# Patient Record
Sex: MALE | Race: OTHER | HISPANIC OR LATINO
[De-identification: names, ages, dates, MRNs, and addresses within clinical notes are randomized per-mention and may not be internally consistent; named-entity substitution may affect disease eponyms.]

---

## 2022-01-19 PROBLEM — Z00.00 ENCOUNTER FOR PREVENTIVE HEALTH EXAMINATION: Status: ACTIVE | Noted: 2022-01-19

## 2022-01-20 ENCOUNTER — APPOINTMENT (OUTPATIENT)
Dept: OTOLARYNGOLOGY | Facility: CLINIC | Age: 52
End: 2022-01-20
Payer: COMMERCIAL

## 2022-01-20 PROCEDURE — 31231 NASAL ENDOSCOPY DX: CPT

## 2022-01-20 PROCEDURE — 99204 OFFICE O/P NEW MOD 45 MIN: CPT | Mod: 25

## 2022-01-20 NOTE — DATA REVIEWED
[de-identified] : CT Sinus 9/23/21:\par INDINGS:\par \par The frontal sinuses are well-developed . There is mucosal thickening along the inferior margins of the frontal sinuses, right greater than left.\par \par There is moderate mucosal thickening throughout the ethmoid air cells which results in opacification multiple ethmoid air cells, left greater than right.\par \par The frontoethmoidal recesses are obstructed by mucosal thickening.\par \par The sphenoid sinuses are well developed . There is mucosal thickening along the anterior walls of the sphenoid sinuses. The sphenoethmoidal recess is obstructed and the left sphenoethmoidal recess is narrowed by mucosal thickening. The sphenoid sinus septum an accessory septation insert upon the osseous covering of the left internal carotid artery.\par \par The maxillary sinuses are well developed . There is moderate mucosal thickening along the periphery the maxillary sinuses, left greater than right. There is suggestion of layering fluid in the left maxillary sinus.. The ostiomeatal complexes are obstructed by mucosal thickening.\par \par The nasal septum is deviated towards the left with an associated septal spur which contacts the left inferior turbinate. The cartilaginous segment of the nasal septum contacts the left nasal ala with narrowing of the vestibule. There is contact between the mucosal surface of the nasal septum and bilateral middle and superior turbinates . There is hypertrophy and polypoid changes of the mucosa of the nasal turbinates and nasal septum.\par \par The included brain parenchyma and orbits are normal.\par \par The sinocranial and sinoorbital junctions are intact.\par \par There is no mastoid effusion.\par \par The nasopharynx is unremarkable.\par \par The temporomandibular joints are notable for cortical erosions in the mandibular condyles..\par \par IMPRESSION:\par \par Pansinus inflammatory changes. There is suggestion of layering fluid in the left maxillary sinus which suggest superimposed acute sinusitis.\par \par There is hypertrophy of the nasal turbinates including polypoid changes of the mucosa, anatomic variants and nasal septal deviation which result in obstruction of air passages and drainage pathways.\par \par There is evidence of nasal valve stenosis.\par \par Degenerative changes of the temporal mandibular joints.

## 2022-01-20 NOTE — ASSESSMENT
[FreeTextEntry1] : 51M referred by Dr. Valdes. He c/o long standing nasal congestion/obstruction. This is worse when laying down and left side is always more affected than the right. There is postnasal drip, thick mucus and nasal itching. He is on abx for sinus infections occasionally. There is no green drainage and nasal sprays do not help his sx. He also has severe positional TOMÁS; he started with the machine, but was unable to continue with the mask due to the severe nasal congestion. There is no asthma or reflux. CT sinus shows moderately severe (left>right) pansinus polypoid mucosal disease, broad left septal deviation and inferior turbinate hypertrophy. On exam, nasal endoscopy shows severe left septal deviation involving the posterior aspect of caudal septum w marked left nasal airway obstruction. There is inferior turbinate hypertrophy and polypoid edema of both middle meati. The rest of the head and neck exam is unremarkable.\par He has chronic pansinusitis (with polyps), marked left septal deviation and turbinate hypertrophy. All of this is contributing to his long standing nasal congestion/obstruction further exacerbating sleep quality, TOMÁS and tolerance of CPAP. At this point, would recommend complete endoscopic sinus surgery, septoplasty and turbinate reduction; this will reestablish nasal airway and paranasal sinus patency and maximize efficiency of CPAP use and topical sinus rinses. This was discussed at length and all questions answered. Plan for OR in the next few week.

## 2022-01-20 NOTE — PHYSICAL EXAM
[Nasal Endoscopy Performed] : nasal endoscopy was performed, see procedure section for findings [Midline] : trachea located in midline position [Normal] : no rashes [de-identified] : crowded opx

## 2022-01-20 NOTE — HISTORY OF PRESENT ILLNESS
[de-identified] : 51M here for initial evaluation.\par \par Referred by Dr. Valdes.\par \par He c/o long standing nasal congestion/obstruction. This is worse when laying down and left side is always more affected than the right. There is postnasal drip, thick mucus and nasal itching. He is on abx for sinus infections occasionally. There is no green drainage. Nasal sprays do not help.\par He also has severe TOMÁS, as below. He started with the machine, but was unable to continue with the mask due to the severe nasal congestion.\par There is no asthma, no reflux.\par \par PSG 9/24/21:\par -severe, positional sleep TOMÁS\par -AHI 63/h (supine AHI 70/h, 83% of test in supine position, non-supine AHI 2.3x less)\par -loud snoring\par \par CT Sinus 9/23/21 (I reviewed images):\par -moderately severe pansinus polypoid mucosal disease, left>right, relative sparing of sphenoids\par -broad left septal deviation\par -turbinate hypertrophy\par \par Of note, he was actually scheduled for surgery by Dr. Valdes. Initially, surgery was cancelled by anesthesia while pt was on table just prior to intubation due to left leg lesions; these lesions were seen by derm and were apparently scabies which had been treated. Then, in prep for surgery again, his EKG showed possible abnormality so surgery was postponed pending a cardiology clearance. He saw the cardiologist and was cleared without issue. Thereafter, he was sent here for further surgical planning.\par \par ROS otherwise unremarkable.

## 2022-01-20 NOTE — PROCEDURE
[FreeTextEntry3] : Nasal Endoscopy:\par severe left septal deviation involving posterior aspect of caudal septum w nasal airway obstruction\par inferior turbinate hypertrophy\par polypoid edema middle meati\par no mucopus

## 2022-01-20 NOTE — CONSULT LETTER
[Dear  ___] : Dear  [unfilled], [Courtesy Letter:] : I had the pleasure of seeing your patient, [unfilled], in my office today. [Consult Closing:] : Thank you very much for allowing me to participate in the care of this patient.  If you have any questions, please do not hesitate to contact me. [Sincerely,] : Sincerely, [FreeTextEntry3] : Luis A Farnsworth MD\par Department of Otolaryngology - Head and Neck Surgery\par [facility] [DrFarzaneh  ___] : Dr. SILVA

## 2022-02-04 ENCOUNTER — LABORATORY RESULT (OUTPATIENT)
Age: 52
End: 2022-02-04

## 2022-02-06 ENCOUNTER — TRANSCRIPTION ENCOUNTER (OUTPATIENT)
Age: 52
End: 2022-02-06

## 2022-02-07 ENCOUNTER — APPOINTMENT (OUTPATIENT)
Dept: OTOLARYNGOLOGY | Facility: HOSPITAL | Age: 52
End: 2022-02-07

## 2022-02-07 ENCOUNTER — RESULT REVIEW (OUTPATIENT)
Age: 52
End: 2022-02-07

## 2022-02-07 ENCOUNTER — OUTPATIENT (OUTPATIENT)
Dept: OUTPATIENT SERVICES | Facility: HOSPITAL | Age: 52
LOS: 1 days | Discharge: HOME | End: 2022-02-07
Payer: COMMERCIAL

## 2022-02-07 LAB
GRAM STN FLD: SIGNIFICANT CHANGE UP
GRAM STN FLD: SIGNIFICANT CHANGE UP
SPECIMEN SOURCE: SIGNIFICANT CHANGE UP
SPECIMEN SOURCE: SIGNIFICANT CHANGE UP

## 2022-02-07 PROCEDURE — 88300 SURGICAL PATH GROSS: CPT | Mod: 26,59

## 2022-02-07 PROCEDURE — 30520 REPAIR OF NASAL SEPTUM: CPT

## 2022-02-07 PROCEDURE — 31287 NASAL/SINUS ENDOSCOPY SURG: CPT | Mod: 50

## 2022-02-07 PROCEDURE — 31240 NSL/SNS NDSC CNCH BULL RESCJ: CPT | Mod: RT

## 2022-02-07 PROCEDURE — 31267 ENDOSCOPY MAXILLARY SINUS: CPT | Mod: 50

## 2022-02-07 PROCEDURE — 88304 TISSUE EXAM BY PATHOLOGIST: CPT | Mod: 26

## 2022-02-07 PROCEDURE — 88311 DECALCIFY TISSUE: CPT | Mod: 26

## 2022-02-07 PROCEDURE — 30130 EXCISE INFERIOR TURBINATE: CPT | Mod: 50

## 2022-02-07 PROCEDURE — 31253 NSL/SINS NDSC TOTAL: CPT | Mod: 50,22

## 2022-02-07 PROCEDURE — 88305 TISSUE EXAM BY PATHOLOGIST: CPT | Mod: 26

## 2022-02-07 PROCEDURE — 61782 SCAN PROC CRANIAL EXTRA: CPT

## 2022-02-07 DEVICE — STENT DRUG ELUTING SINUS BIO ABSORB INTERSECT ENT PROPEL 23MM: Type: IMPLANTABLE DEVICE | Site: BILATERAL | Status: FUNCTIONAL

## 2022-02-07 DEVICE — STENT SINUS DRUG ELUDING PROPEL CONTOUR: Type: IMPLANTABLE DEVICE | Site: BILATERAL | Status: FUNCTIONAL

## 2022-02-07 RX ORDER — SODIUM CHLORIDE 0.65 %
0.65 AEROSOL, SPRAY (ML) NASAL
Qty: 2 | Refills: 5 | Status: ACTIVE | COMMUNITY
Start: 2022-02-07 | End: 1900-01-01

## 2022-02-07 RX ORDER — DOXYCYCLINE 100 MG/1
100 TABLET, FILM COATED ORAL TWICE DAILY
Qty: 20 | Refills: 0 | Status: ACTIVE | COMMUNITY
Start: 2022-02-07 | End: 1900-01-01

## 2022-02-07 RX ORDER — PREDNISONE 10 MG/1
10 TABLET ORAL
Qty: 28 | Refills: 0 | Status: ACTIVE | COMMUNITY
Start: 2022-02-07 | End: 1900-01-01

## 2022-02-07 RX ORDER — OXYCODONE AND ACETAMINOPHEN 5; 325 MG/1; MG/1
5-325 TABLET ORAL
Qty: 30 | Refills: 0 | Status: ACTIVE | COMMUNITY
Start: 2022-02-07 | End: 1900-01-01

## 2022-02-09 LAB
-  CEFAZOLIN: SIGNIFICANT CHANGE UP
-  CLINDAMYCIN: SIGNIFICANT CHANGE UP
-  ERYTHROMYCIN: SIGNIFICANT CHANGE UP
-  LINEZOLID: SIGNIFICANT CHANGE UP
-  OXACILLIN: SIGNIFICANT CHANGE UP
-  RIFAMPIN: SIGNIFICANT CHANGE UP
-  TRIMETHOPRIM/SULFAMETHOXAZOLE: SIGNIFICANT CHANGE UP
-  VANCOMYCIN: SIGNIFICANT CHANGE UP
METHOD TYPE: SIGNIFICANT CHANGE UP

## 2022-02-10 LAB
-  CEFAZOLIN: SIGNIFICANT CHANGE UP
-  CEFAZOLIN: SIGNIFICANT CHANGE UP
-  CLINDAMYCIN: SIGNIFICANT CHANGE UP
-  CLINDAMYCIN: SIGNIFICANT CHANGE UP
-  ERYTHROMYCIN: SIGNIFICANT CHANGE UP
-  ERYTHROMYCIN: SIGNIFICANT CHANGE UP
-  LINEZOLID: SIGNIFICANT CHANGE UP
-  LINEZOLID: SIGNIFICANT CHANGE UP
-  OXACILLIN: SIGNIFICANT CHANGE UP
-  OXACILLIN: SIGNIFICANT CHANGE UP
-  RIFAMPIN: SIGNIFICANT CHANGE UP
-  RIFAMPIN: SIGNIFICANT CHANGE UP
-  TRIMETHOPRIM/SULFAMETHOXAZOLE: SIGNIFICANT CHANGE UP
-  TRIMETHOPRIM/SULFAMETHOXAZOLE: SIGNIFICANT CHANGE UP
-  VANCOMYCIN: SIGNIFICANT CHANGE UP
-  VANCOMYCIN: SIGNIFICANT CHANGE UP
METHOD TYPE: SIGNIFICANT CHANGE UP
METHOD TYPE: SIGNIFICANT CHANGE UP

## 2022-02-11 LAB
-  AMOXICILLIN/CLAVULANIC ACID: SIGNIFICANT CHANGE UP
-  AMOXICILLIN/CLAVULANIC ACID: SIGNIFICANT CHANGE UP
-  CEFTRIAXONE: SIGNIFICANT CHANGE UP
-  CEFTRIAXONE: SIGNIFICANT CHANGE UP
-  CIPROFLOXACIN: SIGNIFICANT CHANGE UP
-  CIPROFLOXACIN: SIGNIFICANT CHANGE UP
-  TRIMETHOPRIM/SULFAMETHOXAZOLE: SIGNIFICANT CHANGE UP
-  TRIMETHOPRIM/SULFAMETHOXAZOLE: SIGNIFICANT CHANGE UP
CULTURE RESULTS: SIGNIFICANT CHANGE UP
CULTURE RESULTS: SIGNIFICANT CHANGE UP
METHOD TYPE: SIGNIFICANT CHANGE UP
METHOD TYPE: SIGNIFICANT CHANGE UP
ORGANISM # SPEC MICROSCOPIC CNT: SIGNIFICANT CHANGE UP
SPECIMEN SOURCE: SIGNIFICANT CHANGE UP
SPECIMEN SOURCE: SIGNIFICANT CHANGE UP

## 2022-02-15 ENCOUNTER — APPOINTMENT (OUTPATIENT)
Dept: OTOLARYNGOLOGY | Facility: CLINIC | Age: 52
End: 2022-02-15
Payer: COMMERCIAL

## 2022-02-15 PROCEDURE — 31237 NSL/SINS NDSC SURG BX POLYPC: CPT | Mod: 58

## 2022-02-15 PROCEDURE — 99024 POSTOP FOLLOW-UP VISIT: CPT

## 2022-02-15 RX ORDER — BUDESONIDE 0.5 MG/2ML
0.5 INHALANT ORAL
Qty: 120 | Refills: 5 | Status: ACTIVE | COMMUNITY
Start: 2022-02-15 | End: 1900-01-01

## 2022-02-15 NOTE — CONSULT LETTER
[Dear  ___] : Dear  [unfilled], [Courtesy Letter:] : I had the pleasure of seeing your patient, [unfilled], in my office today. [Sincerely,] : Sincerely, [Consult Closing:] : Thank you very much for allowing me to participate in the care of this patient.  If you have any questions, please do not hesitate to contact me. [FreeTextEntry3] : Luis A Farnsworth MD\par Department of Otolaryngology - Head and Neck Surgery\par [facility] [DrFarzaneh  ___] : Dr. SILVA

## 2022-02-15 NOTE — HISTORY OF PRESENT ILLNESS
[de-identified] : 51M here in first postoperative visit s/p SMR/ESS (maxillary, ethmoid, sphenoid, frontal) 2/7/22 for nasal obstruction and chronic sinusitis. Intraoperatively, severe left septal deviation and moderate frontoethmoid polypoid edema.\par \par He is doing ok since surgery. There is congestion. He took the medication as prescribed.\par \par OR Cx:\par -few moraxella\par -rare/few MSSA\par \par Pathology: pending\par \par ROS otherwise unremarkable.

## 2022-02-15 NOTE — PHYSICAL EXAM
[Nasal Endoscopy Performed] : nasal endoscopy was performed, see procedure section for findings [Midline] : trachea located in midline position [de-identified] : crowded opx [Normal] : no rashes

## 2022-02-15 NOTE — ASSESSMENT
[FreeTextEntry1] : 51M here in first postoperative visit s/p SMR/ESS (maxillary, ethmoid, sphenoid, frontal) 2/7/22 for nasal obstruction and chronic sinusitis. Intraoperatively, severe left septal deviation and moderate frontoethmoid polypoid edema. He is doing ok since surgery. There is congestion. He took the medication as prescribed. On exam, there are well healing postoperative changes with patent nasal airways and middle meati. He felt much better after debridement.\par He is doing very well. To start BID budesonide rinses. Advance activity. Hold off on CPAP until next visit. RTO 4 weeks.\par

## 2022-02-15 NOTE — DATA REVIEWED
[de-identified] : CT Sinus 9/23/21:\par INDINGS:\par \par The frontal sinuses are well-developed . There is mucosal thickening along the inferior margins of the frontal sinuses, right greater than left.\par \par There is moderate mucosal thickening throughout the ethmoid air cells which results in opacification multiple ethmoid air cells, left greater than right.\par \par The frontoethmoidal recesses are obstructed by mucosal thickening.\par \par The sphenoid sinuses are well developed . There is mucosal thickening along the anterior walls of the sphenoid sinuses. The sphenoethmoidal recess is obstructed and the left sphenoethmoidal recess is narrowed by mucosal thickening. The sphenoid sinus septum an accessory septation insert upon the osseous covering of the left internal carotid artery.\par \par The maxillary sinuses are well developed . There is moderate mucosal thickening along the periphery the maxillary sinuses, left greater than right. There is suggestion of layering fluid in the left maxillary sinus.. The ostiomeatal complexes are obstructed by mucosal thickening.\par \par The nasal septum is deviated towards the left with an associated septal spur which contacts the left inferior turbinate. The cartilaginous segment of the nasal septum contacts the left nasal ala with narrowing of the vestibule. There is contact between the mucosal surface of the nasal septum and bilateral middle and superior turbinates . There is hypertrophy and polypoid changes of the mucosa of the nasal turbinates and nasal septum.\par \par The included brain parenchyma and orbits are normal.\par \par The sinocranial and sinoorbital junctions are intact.\par \par There is no mastoid effusion.\par \par The nasopharynx is unremarkable.\par \par The temporomandibular joints are notable for cortical erosions in the mandibular condyles..\par \par IMPRESSION:\par \par Pansinus inflammatory changes. There is suggestion of layering fluid in the left maxillary sinus which suggest superimposed acute sinusitis.\par \par There is hypertrophy of the nasal turbinates including polypoid changes of the mucosa, anatomic variants and nasal septal deviation which result in obstruction of air passages and drainage pathways.\par \par There is evidence of nasal valve stenosis.\par \par Degenerative changes of the temporal mandibular joints.

## 2022-02-15 NOTE — PROCEDURE
[FreeTextEntry3] : doyles and propels removed\par \par Nasal Endoscopy:\par copious coagulum and debris removed\par septum intact and midline\par middle meati and paranasal sinuses widely patent\par contours in frontals

## 2022-02-22 LAB — SURGICAL PATHOLOGY STUDY: SIGNIFICANT CHANGE UP

## 2022-03-15 ENCOUNTER — APPOINTMENT (OUTPATIENT)
Dept: OTOLARYNGOLOGY | Facility: CLINIC | Age: 52
End: 2022-03-15
Payer: COMMERCIAL

## 2022-03-15 VITALS
SYSTOLIC BLOOD PRESSURE: 129 MMHG | WEIGHT: 225 LBS | HEART RATE: 69 BPM | DIASTOLIC BLOOD PRESSURE: 85 MMHG | BODY MASS INDEX: 34.1 KG/M2 | HEIGHT: 68 IN | TEMPERATURE: 96.8 F

## 2022-03-15 PROCEDURE — 99024 POSTOP FOLLOW-UP VISIT: CPT

## 2022-03-15 PROCEDURE — 31237 NSL/SINS NDSC SURG BX POLYPC: CPT | Mod: 58

## 2022-03-15 RX ORDER — MUPIROCIN 20 MG/G
2 OINTMENT TOPICAL TWICE DAILY
Qty: 1 | Refills: 0 | Status: ACTIVE | COMMUNITY
Start: 2022-03-15 | End: 1900-01-01

## 2022-03-15 NOTE — PHYSICAL EXAM
[Nasal Endoscopy Performed] : nasal endoscopy was performed, see procedure section for findings [Midline] : trachea located in midline position [de-identified] : crowded opx [Normal] : no rashes

## 2022-03-15 NOTE — DATA REVIEWED
[de-identified] : CT Sinus 9/23/21:\par INDINGS:\par \par The frontal sinuses are well-developed . There is mucosal thickening along the inferior margins of the frontal sinuses, right greater than left.\par \par There is moderate mucosal thickening throughout the ethmoid air cells which results in opacification multiple ethmoid air cells, left greater than right.\par \par The frontoethmoidal recesses are obstructed by mucosal thickening.\par \par The sphenoid sinuses are well developed . There is mucosal thickening along the anterior walls of the sphenoid sinuses. The sphenoethmoidal recess is obstructed and the left sphenoethmoidal recess is narrowed by mucosal thickening. The sphenoid sinus septum an accessory septation insert upon the osseous covering of the left internal carotid artery.\par \par The maxillary sinuses are well developed . There is moderate mucosal thickening along the periphery the maxillary sinuses, left greater than right. There is suggestion of layering fluid in the left maxillary sinus.. The ostiomeatal complexes are obstructed by mucosal thickening.\par \par The nasal septum is deviated towards the left with an associated septal spur which contacts the left inferior turbinate. The cartilaginous segment of the nasal septum contacts the left nasal ala with narrowing of the vestibule. There is contact between the mucosal surface of the nasal septum and bilateral middle and superior turbinates . There is hypertrophy and polypoid changes of the mucosa of the nasal turbinates and nasal septum.\par \par The included brain parenchyma and orbits are normal.\par \par The sinocranial and sinoorbital junctions are intact.\par \par There is no mastoid effusion.\par \par The nasopharynx is unremarkable.\par \par The temporomandibular joints are notable for cortical erosions in the mandibular condyles..\par \par IMPRESSION:\par \par Pansinus inflammatory changes. There is suggestion of layering fluid in the left maxillary sinus which suggest superimposed acute sinusitis.\par \par There is hypertrophy of the nasal turbinates including polypoid changes of the mucosa, anatomic variants and nasal septal deviation which result in obstruction of air passages and drainage pathways.\par \par There is evidence of nasal valve stenosis.\par \par Degenerative changes of the temporal mandibular joints.

## 2022-03-15 NOTE — CONSULT LETTER
[Dear  ___] : Dear  [unfilled], [Courtesy Letter:] : I had the pleasure of seeing your patient, [unfilled], in my office today. [Consult Closing:] : Thank you very much for allowing me to participate in the care of this patient.  If you have any questions, please do not hesitate to contact me. [Sincerely,] : Sincerely, [FreeTextEntry3] : Luis A Farnsworth MD\par Department of Otolaryngology - Head and Neck Surgery\par [facility] [DrFarzaneh  ___] : Dr. SILVA

## 2022-03-15 NOTE — PROCEDURE
[FreeTextEntry3] : Nasal Endoscopy:\par crusting removed from anterior nasal vault and over caudal septum and also over ITs\par mild left anterior septal deflection, but septum intact and midline\par middle meati and paranasal sinuses widely patent, mild crusting removed\par

## 2022-03-15 NOTE — HISTORY OF PRESENT ILLNESS
[de-identified] : 52M here in second postoperative visit s/p SMR/ESS (maxillary, ethmoid, sphenoid, frontal) 2/7/22 for nasal obstruction and chronic eosinophilic sinusitis. Intraoperatively, severe left septal deviation and moderate frontoethmoid polypoid edema.\par \par He is doing well since last seen. He is using BID budesonide rinses. Congestion is markedly improved. He still feels left sided breathing not as good as right. No other complaints.\par \par Pathology:\par 1. Right sinus contents:\par - Bone and respiratory mucosa, with thickened subepithelial collagen.\par 2. Right middle turbinate:\par - Respiratory mucosa with edema and increased eosinophils.\par 3. Left sinus contents:\par - Bone and respiratory mucosa with increased eosinophils and thickened subepithelial collagen.\par 4. Bilateral sinus shavings:\par - Bone and respiratory mucosa with chronic active inflammation and thickened subepithelial collagen.\par 5. Nasal septum:\par - Fragments of nasal bone and cartilage, gross exam.\par 6. Left inferior turbinate:\par - Portion of turbinate with thickened subepithelial collagen and moderate chronic active inflammation, predominantly lymphocytes and eosinophils.\par 7. Right inferior turbinate:\par - Portion of turbinate with thickened subepithelial collagen and mild chronic active inflammation, predominantly lymphocytes and eosinophils.\par \par \par ROS otherwise unremarkable.

## 2022-05-31 ENCOUNTER — APPOINTMENT (OUTPATIENT)
Dept: OTOLARYNGOLOGY | Facility: CLINIC | Age: 52
End: 2022-05-31
Payer: COMMERCIAL

## 2022-05-31 VITALS
BODY MASS INDEX: 34.1 KG/M2 | HEART RATE: 83 BPM | WEIGHT: 225 LBS | SYSTOLIC BLOOD PRESSURE: 122 MMHG | HEIGHT: 68 IN | TEMPERATURE: 96.8 F | DIASTOLIC BLOOD PRESSURE: 92 MMHG

## 2022-05-31 DIAGNOSIS — J34.2 DEVIATED NASAL SEPTUM: ICD-10-CM

## 2022-05-31 DIAGNOSIS — J32.4 CHRONIC PANSINUSITIS: ICD-10-CM

## 2022-05-31 DIAGNOSIS — G47.33 OBSTRUCTIVE SLEEP APNEA (ADULT) (PEDIATRIC): ICD-10-CM

## 2022-05-31 PROCEDURE — 31231 NASAL ENDOSCOPY DX: CPT | Mod: 58

## 2022-05-31 PROCEDURE — 99024 POSTOP FOLLOW-UP VISIT: CPT

## 2022-05-31 NOTE — HISTORY OF PRESENT ILLNESS
[de-identified] : 52M here in routine postoperative visit s/p SMR/ESS (maxillary, ethmoid, sphenoid, frontal) 2/7/22 for nasal obstruction and chronic eosinophilic sinusitis. Intraoperatively, severe left septal deviation and moderate frontoethmoid polypoid edema.\par \par He is doing very well since last seen. He is using budesonide rinses mostly every other day or so. Congestion is markedly improved and he is breathing great from his nose; sleep is also much better. There are no other complaints.\par \par ROS otherwise unremarkable.

## 2022-05-31 NOTE — PROCEDURE
[FreeTextEntry3] : Nasal Endoscopy:\par nasal airways widely patent\par septum intact and midline\par middle meati and paranasal sinuses widely patent\par no mucopus or polyps

## 2022-05-31 NOTE — CONSULT LETTER
[Dear  ___] : Dear  [unfilled], [Courtesy Letter:] : I had the pleasure of seeing your patient, [unfilled], in my office today. [Consult Closing:] : Thank you very much for allowing me to participate in the care of this patient.  If you have any questions, please do not hesitate to contact me. [Sincerely,] : Sincerely, [DrFarzaneh  ___] : Dr. SILVA [FreeTextEntry3] : Luis A Farnsworth MD\par Department of Otolaryngology - Head and Neck Surgery\par [facility]

## 2022-05-31 NOTE — ASSESSMENT
[FreeTextEntry1] : 52M here in routine postoperative visit s/p SMR/ESS (maxillary, ethmoid, sphenoid, frontal) 2/7/22 for nasal obstruction and chronic eosinophilic sinusitis. Intraoperatively, severe left septal deviation and moderate frontoethmoid polypoid edema. He is doing very well since last seen. He is using budesonide rinses mostly every other day or so. Congestion is markedly improved and he is breathing great from his nose; sleep is also much better. He has not yet started his CPAP. On exam, nasal endoscopy shows well healed postoperative changes with widely patent nasal airways and middle meati. \par He is doing very well. For now, continue budesonide rinses as he is doing. Start CPAP. RTO 6 months in routine followup.\par

## 2022-05-31 NOTE — DATA REVIEWED
[de-identified] : CT Sinus 9/23/21:\par INDINGS:\par \par The frontal sinuses are well-developed . There is mucosal thickening along the inferior margins of the frontal sinuses, right greater than left.\par \par There is moderate mucosal thickening throughout the ethmoid air cells which results in opacification multiple ethmoid air cells, left greater than right.\par \par The frontoethmoidal recesses are obstructed by mucosal thickening.\par \par The sphenoid sinuses are well developed . There is mucosal thickening along the anterior walls of the sphenoid sinuses. The sphenoethmoidal recess is obstructed and the left sphenoethmoidal recess is narrowed by mucosal thickening. The sphenoid sinus septum an accessory septation insert upon the osseous covering of the left internal carotid artery.\par \par The maxillary sinuses are well developed . There is moderate mucosal thickening along the periphery the maxillary sinuses, left greater than right. There is suggestion of layering fluid in the left maxillary sinus.. The ostiomeatal complexes are obstructed by mucosal thickening.\par \par The nasal septum is deviated towards the left with an associated septal spur which contacts the left inferior turbinate. The cartilaginous segment of the nasal septum contacts the left nasal ala with narrowing of the vestibule. There is contact between the mucosal surface of the nasal septum and bilateral middle and superior turbinates . There is hypertrophy and polypoid changes of the mucosa of the nasal turbinates and nasal septum.\par \par The included brain parenchyma and orbits are normal.\par \par The sinocranial and sinoorbital junctions are intact.\par \par There is no mastoid effusion.\par \par The nasopharynx is unremarkable.\par \par The temporomandibular joints are notable for cortical erosions in the mandibular condyles..\par \par IMPRESSION:\par \par Pansinus inflammatory changes. There is suggestion of layering fluid in the left maxillary sinus which suggest superimposed acute sinusitis.\par \par There is hypertrophy of the nasal turbinates including polypoid changes of the mucosa, anatomic variants and nasal septal deviation which result in obstruction of air passages and drainage pathways.\par \par There is evidence of nasal valve stenosis.\par \par Degenerative changes of the temporal mandibular joints.

## 2022-05-31 NOTE — PHYSICAL EXAM
[Nasal Endoscopy Performed] : nasal endoscopy was performed, see procedure section for findings [Midline] : trachea located in midline position [Normal] : no rashes [de-identified] : crowded opx

## 2023-01-05 ENCOUNTER — APPOINTMENT (OUTPATIENT)
Dept: OTOLARYNGOLOGY | Facility: CLINIC | Age: 53
End: 2023-01-05

## (undated) DEVICE — GLV 7 PROTEXIS (WHITE)

## (undated) DEVICE — SYR LUER LOK 50CC

## (undated) DEVICE — SYR ASEPTO

## (undated) DEVICE — STAPLER SKIN VISI-STAT 35 WIDE

## (undated) DEVICE — SOL ANTI FOG

## (undated) DEVICE — PACK RHINOPLASTY

## (undated) DEVICE — SUT CHROMIC 4-0 18" G-3

## (undated) DEVICE — WARMING BLANKET LOWER ADULT

## (undated) DEVICE — BLADE MEDTRONIC ENT INFERIOR TURBINATE ROTATABLE STRAIGHT 2.9MM X 11CM

## (undated) DEVICE — Device

## (undated) DEVICE — ELCTR BOVIE SUCTION 8FR 6"

## (undated) DEVICE — PETRI DISH MED 3.5"

## (undated) DEVICE — DRAPE MAYO STAND 23"

## (undated) DEVICE — DRSG GAUZE SPONGE 2X2" STERILE

## (undated) DEVICE — SLV COMPRESSION KNEE MED

## (undated) DEVICE — MARKING PEN W RULER